# Patient Record
Sex: FEMALE | Race: WHITE | HISPANIC OR LATINO | Employment: STUDENT | URBAN - METROPOLITAN AREA
[De-identification: names, ages, dates, MRNs, and addresses within clinical notes are randomized per-mention and may not be internally consistent; named-entity substitution may affect disease eponyms.]

---
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
## 2025-04-02 ENCOUNTER — OFFICE VISIT (OUTPATIENT)
Dept: URGENT CARE | Facility: CLINIC | Age: 20
End: 2025-04-02
Payer: COMMERCIAL

## 2025-04-02 VITALS
TEMPERATURE: 98 F | BODY MASS INDEX: 40.12 KG/M2 | HEART RATE: 71 BPM | DIASTOLIC BLOOD PRESSURE: 83 MMHG | OXYGEN SATURATION: 100 % | RESPIRATION RATE: 18 BRPM | HEIGHT: 64 IN | WEIGHT: 235 LBS | SYSTOLIC BLOOD PRESSURE: 122 MMHG

## 2025-04-02 DIAGNOSIS — L05.91 PILONIDAL CYST WITHOUT ABSCESS: Primary | ICD-10-CM

## 2025-04-02 PROCEDURE — 99213 OFFICE O/P EST LOW 20 MIN: CPT | Mod: S$PBB,,, | Performed by: NURSE PRACTITIONER

## 2025-04-02 PROCEDURE — 99215 OFFICE O/P EST HI 40 MIN: CPT | Mod: PBBFAC | Performed by: NURSE PRACTITIONER

## 2025-04-02 RX ORDER — IBUPROFEN 800 MG/1
800 TABLET ORAL 3 TIMES DAILY
Qty: 15 TABLET | Refills: 0 | Status: SHIPPED | OUTPATIENT
Start: 2025-04-02 | End: 2025-04-07

## 2025-04-03 NOTE — PATIENT INSTRUCTIONS
Please follow instructions on patient education material.  Return to urgent care in 2 to 3 days if symptoms are not improving, immediately if you develop any new or worsening symptoms.     - do not put hydrogen peroxide, rubbing alcohol, or any items from the kitchen into the wound/area  - take antibiotics as prescribed  - if you get fever, increasing pain, increasing drainage, go to the ER.  - follow up with your PCP next week for a wound check    Apply Warm Compresses to your infection 4-5x/day.  Run the warm to hot water in the sink. Wet a wash rag completely. Ring it out. Fold it in half or in fours. Apply to cyst/inner buttock area Leave it there until it reaches room temperature, then throw it in the dirty clothes. Do not use the same rag more than once. Wash them all, including other bath towels and clothes in hot water.

## 2025-04-03 NOTE — PROGRESS NOTES
"Subjective:      Patient ID: Immanuel Johnston is a 19 y.o. female.    Vitals:  height is 5' 4" (1.626 m) and weight is 106.6 kg (235 lb). Her temperature is 98 °F (36.7 °C). Her blood pressure is 122/83 and her pulse is 71. Her respiration is 18 and oxygen saturation is 100%.     Chief Complaint: Cyst (Pt c/o reoccurring cyst x 1 yr )    HPI as stated in chief complaint.  Patient denies fever, stomach pain, nausea or vomiting, dysuria.    Skin:  Positive for erythema.      Objective:     Physical Exam   Constitutional: She is oriented to person, place, and time. She appears well-developed.  Non-toxic appearance. She does not appear ill. No distress.   HENT:   Head: Atraumatic.   Nose: No purulent discharge. Right sinus exhibits no maxillary sinus tenderness and no frontal sinus tenderness. Left sinus exhibits no maxillary sinus tenderness and no frontal sinus tenderness.   Mouth/Throat: Uvula is midline.   Eyes: Right eye exhibits no discharge. Left eye exhibits no discharge. Extraocular movement intact   Neck: Neck supple. No neck rigidity present.   Cardiovascular: Regular rhythm.   Pulmonary/Chest: Effort normal and breath sounds normal. No respiratory distress. She has no wheezes. She has no rhonchi. She has no rales.   Genitourinary:         Comments: In inner right buttock there is a  1 cm pinpoint raised inflamed bump, consistent with pilonidal cyst, no fluctuance or induration. TTP     Lymphadenopathy:     She has no cervical adenopathy.   Neurological: no focal deficit. She is alert and oriented to person, place, and time.   Skin: Skin is warm, dry and not diaphoretic. Capillary refill takes less than 2 seconds. erythema and lesion   Psychiatric: Her behavior is normal. Mood, judgment and thought content normal.   Nursing note and vitals reviewed.chaperone present (JOVANA Miranda)         Assessment:     1. Pilonidal cyst without abscess        Plan:   ER precautions given discussed  Pt denies I&D and/or needle " aspiration, requesting pain management only, I felt this was reasonable considering there is no abscess or infection she would like to await surgical washout that is scheduled in 2 weeks  Instructed on Sitz baths    Pilonidal cyst without abscess  -     ibuprofen (ADVIL,MOTRIN) 800 MG tablet; Take 1 tablet (800 mg total) by mouth 3 (three) times daily. for 5 days  Dispense: 15 tablet; Refill: 0